# Patient Record
Sex: FEMALE | Race: WHITE
[De-identification: names, ages, dates, MRNs, and addresses within clinical notes are randomized per-mention and may not be internally consistent; named-entity substitution may affect disease eponyms.]

---

## 2018-07-09 ENCOUNTER — OTHER (OUTPATIENT)
Age: 33
End: 2018-07-09

## 2018-07-09 PROBLEM — Z00.00 ENCOUNTER FOR PREVENTIVE HEALTH EXAMINATION: Status: ACTIVE | Noted: 2018-07-09

## 2018-07-12 ENCOUNTER — APPOINTMENT (OUTPATIENT)
Dept: OTOLARYNGOLOGY | Facility: CLINIC | Age: 33
End: 2018-07-12

## 2018-08-06 ENCOUNTER — OTHER (OUTPATIENT)
Age: 33
End: 2018-08-06

## 2018-08-06 RX ORDER — RANITIDINE 150 MG/1
150 TABLET ORAL
Qty: 60 | Refills: 1 | Status: ACTIVE | COMMUNITY
Start: 2018-08-06 | End: 1900-01-01

## 2018-10-15 ENCOUNTER — OTHER (OUTPATIENT)
Age: 33
End: 2018-10-15

## 2018-11-05 ENCOUNTER — OTHER (OUTPATIENT)
Age: 33
End: 2018-11-05

## 2018-11-05 RX ORDER — RANITIDINE 300 MG/1
300 TABLET ORAL
Qty: 30 | Refills: 4 | Status: ACTIVE | COMMUNITY
Start: 2018-07-09 | End: 1900-01-01

## 2018-11-08 ENCOUNTER — OTHER (OUTPATIENT)
Age: 33
End: 2018-11-08

## 2019-05-15 ENCOUNTER — OTHER (OUTPATIENT)
Age: 34
End: 2019-05-15

## 2019-05-15 RX ORDER — BENZONATATE 100 MG/1
100 CAPSULE ORAL 3 TIMES DAILY
Qty: 30 | Refills: 1 | Status: ACTIVE | COMMUNITY
Start: 2019-05-15 | End: 1900-01-01

## 2019-07-10 ENCOUNTER — OTHER (OUTPATIENT)
Age: 34
End: 2019-07-10

## 2019-07-10 RX ORDER — AMOXICILLIN 500 MG/1
500 TABLET, FILM COATED ORAL
Qty: 14 | Refills: 0 | Status: ACTIVE | COMMUNITY
Start: 2019-07-10 | End: 1900-01-01

## 2019-10-07 ENCOUNTER — OTHER (OUTPATIENT)
Age: 34
End: 2019-10-07

## 2019-12-23 ENCOUNTER — OTHER (OUTPATIENT)
Age: 34
End: 2019-12-23

## 2019-12-23 RX ORDER — FLUTICASONE PROPIONATE 50 UG/1
50 SPRAY, METERED NASAL
Qty: 1 | Refills: 3 | Status: ACTIVE | COMMUNITY
Start: 2018-10-15 | End: 1900-01-01

## 2020-02-10 RX ORDER — ADAPALENE AND BENZOYL PEROXIDE 1; 25 MG/G; MG/G
0.1-2.5 GEL TOPICAL
Qty: 1 | Refills: 3 | Status: ACTIVE | COMMUNITY
Start: 2020-02-10 | End: 1900-01-01

## 2020-02-24 RX ORDER — ADAPALENE 1 MG/G
0.1 CREAM TOPICAL
Qty: 1 | Refills: 4 | Status: ACTIVE | COMMUNITY
Start: 2020-02-24 | End: 1900-01-01

## 2020-03-19 RX ORDER — ALBUTEROL SULFATE 90 UG/1
108 (90 BASE) INHALANT RESPIRATORY (INHALATION)
Qty: 1 | Refills: 0 | Status: ACTIVE | COMMUNITY
Start: 2019-10-07 | End: 1900-01-01

## 2020-04-12 ENCOUNTER — TRANSCRIPTION ENCOUNTER (OUTPATIENT)
Age: 35
End: 2020-04-12

## 2020-04-25 ENCOUNTER — MESSAGE (OUTPATIENT)
Age: 35
End: 2020-04-25

## 2020-05-04 ENCOUNTER — APPOINTMENT (OUTPATIENT)
Dept: DISASTER EMERGENCY | Facility: HOSPITAL | Age: 35
End: 2020-05-04

## 2020-05-05 LAB
SARS-COV-2 IGG SERPL IA-ACNC: <0.1 INDEX
SARS-COV-2 IGG SERPL QL IA: NEGATIVE

## 2021-07-07 ENCOUNTER — OUTPATIENT (OUTPATIENT)
Dept: OUTPATIENT SERVICES | Facility: HOSPITAL | Age: 36
LOS: 1 days | Discharge: HOME | End: 2021-07-07
Payer: COMMERCIAL

## 2021-07-07 DIAGNOSIS — R92.2 INCONCLUSIVE MAMMOGRAM: ICD-10-CM

## 2021-07-07 DIAGNOSIS — Z12.31 ENCOUNTER FOR SCREENING MAMMOGRAM FOR MALIGNANT NEOPLASM OF BREAST: ICD-10-CM

## 2021-07-07 PROCEDURE — 77067 SCR MAMMO BI INCL CAD: CPT | Mod: 26

## 2021-07-07 PROCEDURE — 76641 ULTRASOUND BREAST COMPLETE: CPT | Mod: 26,50

## 2021-07-07 PROCEDURE — 77063 BREAST TOMOSYNTHESIS BI: CPT | Mod: 26

## 2021-07-23 ENCOUNTER — OUTPATIENT (OUTPATIENT)
Dept: OUTPATIENT SERVICES | Facility: HOSPITAL | Age: 36
LOS: 1 days | Discharge: HOME | End: 2021-07-23
Payer: COMMERCIAL

## 2021-07-23 DIAGNOSIS — R92.8 OTHER ABNORMAL AND INCONCLUSIVE FINDINGS ON DIAGNOSTIC IMAGING OF BREAST: ICD-10-CM

## 2021-07-23 PROCEDURE — 76642 ULTRASOUND BREAST LIMITED: CPT | Mod: 26,RT

## 2021-07-23 PROCEDURE — 77065 DX MAMMO INCL CAD UNI: CPT | Mod: 26,RT

## 2021-07-23 PROCEDURE — G0279: CPT | Mod: 26

## 2022-01-26 ENCOUNTER — OUTPATIENT (OUTPATIENT)
Dept: OUTPATIENT SERVICES | Facility: HOSPITAL | Age: 37
LOS: 1 days | Discharge: HOME | End: 2022-01-26
Payer: COMMERCIAL

## 2022-01-26 DIAGNOSIS — R92.8 OTHER ABNORMAL AND INCONCLUSIVE FINDINGS ON DIAGNOSTIC IMAGING OF BREAST: ICD-10-CM

## 2022-01-26 PROCEDURE — 76642 ULTRASOUND BREAST LIMITED: CPT | Mod: 26,RT

## 2022-05-28 ENCOUNTER — RESULT REVIEW (OUTPATIENT)
Age: 37
End: 2022-05-28

## 2022-05-28 ENCOUNTER — OUTPATIENT (OUTPATIENT)
Dept: OUTPATIENT SERVICES | Facility: HOSPITAL | Age: 37
LOS: 1 days | Discharge: HOME | End: 2022-05-28
Payer: COMMERCIAL

## 2022-05-28 DIAGNOSIS — R10.2 PELVIC AND PERINEAL PAIN: ICD-10-CM

## 2022-05-28 PROCEDURE — 76830 TRANSVAGINAL US NON-OB: CPT | Mod: 26

## 2022-05-28 PROCEDURE — 76856 US EXAM PELVIC COMPLETE: CPT | Mod: 26,59

## 2022-06-02 ENCOUNTER — APPOINTMENT (OUTPATIENT)
Dept: OBGYN | Facility: CLINIC | Age: 37
End: 2022-06-02

## 2022-07-06 ENCOUNTER — LABORATORY RESULT (OUTPATIENT)
Age: 37
End: 2022-07-06

## 2022-07-06 ENCOUNTER — APPOINTMENT (OUTPATIENT)
Dept: OBGYN | Facility: CLINIC | Age: 37
End: 2022-07-06

## 2022-07-06 VITALS — BODY MASS INDEX: 28.71 KG/M2 | TEMPERATURE: 98.1 F | HEIGHT: 62 IN | WEIGHT: 156 LBS

## 2022-07-06 DIAGNOSIS — R92.2 INCONCLUSIVE MAMMOGRAM: ICD-10-CM

## 2022-07-06 DIAGNOSIS — Z30.432 ENCOUNTER FOR REMOVAL OF INTRAUTERINE CONTRACEPTIVE DEVICE: ICD-10-CM

## 2022-07-06 PROCEDURE — 99213 OFFICE O/P EST LOW 20 MIN: CPT | Mod: 25

## 2022-07-06 PROCEDURE — 58301 REMOVE INTRAUTERINE DEVICE: CPT

## 2022-07-06 PROCEDURE — 99395 PREV VISIT EST AGE 18-39: CPT | Mod: 25

## 2022-07-06 PROCEDURE — 81025 URINE PREGNANCY TEST: CPT

## 2022-08-24 ENCOUNTER — OUTPATIENT (OUTPATIENT)
Dept: OUTPATIENT SERVICES | Facility: HOSPITAL | Age: 37
LOS: 1 days | Discharge: HOME | End: 2022-08-24

## 2022-08-24 DIAGNOSIS — R92.8 OTHER ABNORMAL AND INCONCLUSIVE FINDINGS ON DIAGNOSTIC IMAGING OF BREAST: ICD-10-CM

## 2022-08-24 PROCEDURE — 76642 ULTRASOUND BREAST LIMITED: CPT | Mod: 26,RT

## 2023-03-02 ENCOUNTER — LABORATORY RESULT (OUTPATIENT)
Age: 38
End: 2023-03-02

## 2023-03-02 ENCOUNTER — APPOINTMENT (OUTPATIENT)
Dept: OBGYN | Facility: CLINIC | Age: 38
End: 2023-03-02
Payer: COMMERCIAL

## 2023-03-02 VITALS — TEMPERATURE: 97.9 F | WEIGHT: 140.4 LBS | BODY MASS INDEX: 25.83 KG/M2 | HEIGHT: 62 IN

## 2023-03-02 DIAGNOSIS — N39.3 STRESS INCONTINENCE (FEMALE) (MALE): ICD-10-CM

## 2023-03-02 DIAGNOSIS — N94.6 DYSMENORRHEA, UNSPECIFIED: ICD-10-CM

## 2023-03-02 DIAGNOSIS — Z01.419 ENCOUNTER FOR GYNECOLOGICAL EXAMINATION (GENERAL) (ROUTINE) W/OUT ABNORMAL FINDINGS: ICD-10-CM

## 2023-03-02 PROCEDURE — 99395 PREV VISIT EST AGE 18-39: CPT

## 2023-03-24 ENCOUNTER — OUTPATIENT (OUTPATIENT)
Dept: OUTPATIENT SERVICES | Facility: HOSPITAL | Age: 38
LOS: 1 days | End: 2023-03-24
Payer: COMMERCIAL

## 2023-03-24 ENCOUNTER — RESULT REVIEW (OUTPATIENT)
Age: 38
End: 2023-03-24

## 2023-03-24 DIAGNOSIS — R92.8 OTHER ABNORMAL AND INCONCLUSIVE FINDINGS ON DIAGNOSTIC IMAGING OF BREAST: ICD-10-CM

## 2023-03-24 DIAGNOSIS — Z00.8 ENCOUNTER FOR OTHER GENERAL EXAMINATION: ICD-10-CM

## 2023-03-24 PROCEDURE — 76642 ULTRASOUND BREAST LIMITED: CPT | Mod: RT

## 2023-03-24 PROCEDURE — 76642 ULTRASOUND BREAST LIMITED: CPT | Mod: 26,RT

## 2023-03-25 DIAGNOSIS — R92.8 OTHER ABNORMAL AND INCONCLUSIVE FINDINGS ON DIAGNOSTIC IMAGING OF BREAST: ICD-10-CM

## 2023-06-19 ENCOUNTER — NON-APPOINTMENT (OUTPATIENT)
Age: 38
End: 2023-06-19

## 2023-06-23 ENCOUNTER — NON-APPOINTMENT (OUTPATIENT)
Age: 38
End: 2023-06-23

## 2023-07-05 ENCOUNTER — APPOINTMENT (OUTPATIENT)
Dept: OBGYN | Facility: CLINIC | Age: 38
End: 2023-07-05
Payer: COMMERCIAL

## 2023-07-05 VITALS — BODY MASS INDEX: 26.13 KG/M2 | WEIGHT: 142 LBS | HEIGHT: 62 IN

## 2023-07-05 DIAGNOSIS — O09.529 SUPERVISION OF ELDERLY MULTIGRAVIDA, UNSPECIFIED TRIMESTER: ICD-10-CM

## 2023-07-05 DIAGNOSIS — Z87.410 PERSONAL HISTORY OF CERVICAL DYSPLASIA: ICD-10-CM

## 2023-07-05 DIAGNOSIS — Z86.19 PERSONAL HISTORY OF OTHER INFECTIOUS AND PARASITIC DISEASES: ICD-10-CM

## 2023-07-05 PROCEDURE — 99214 OFFICE O/P EST MOD 30 MIN: CPT

## 2023-07-05 PROCEDURE — 76830 TRANSVAGINAL US NON-OB: CPT

## 2023-07-07 PROBLEM — O09.529 AMA (ADVANCED MATERNAL AGE) MULTIGRAVIDA 35+: Status: ACTIVE | Noted: 2023-07-07

## 2023-07-07 PROBLEM — Z87.410 HISTORY OF CERVICAL DYSPLASIA: Status: ACTIVE | Noted: 2022-07-06

## 2023-07-07 PROBLEM — Z86.19 HISTORY OF HERPES SIMPLEX INFECTION: Status: ACTIVE | Noted: 2023-07-07

## 2023-07-27 ENCOUNTER — APPOINTMENT (OUTPATIENT)
Dept: OBGYN | Facility: CLINIC | Age: 38
End: 2023-07-27
Payer: COMMERCIAL

## 2023-07-27 VITALS — TEMPERATURE: 97.8 F | WEIGHT: 146 LBS | HEIGHT: 62 IN | BODY MASS INDEX: 26.87 KG/M2

## 2023-07-27 DIAGNOSIS — E28.2 POLYCYSTIC OVARIAN SYNDROME: ICD-10-CM

## 2023-07-27 DIAGNOSIS — N91.1 SECONDARY AMENORRHEA: ICD-10-CM

## 2023-07-27 PROCEDURE — 76830 TRANSVAGINAL US NON-OB: CPT

## 2023-07-27 PROCEDURE — 99213 OFFICE O/P EST LOW 20 MIN: CPT

## 2023-08-01 ENCOUNTER — RESULT REVIEW (OUTPATIENT)
Age: 38
End: 2023-08-01

## 2023-08-01 ENCOUNTER — OUTPATIENT (OUTPATIENT)
Dept: OUTPATIENT SERVICES | Facility: HOSPITAL | Age: 38
LOS: 1 days | Discharge: HOME | End: 2023-08-01
Payer: COMMERCIAL

## 2023-08-01 ENCOUNTER — TRANSCRIPTION ENCOUNTER (OUTPATIENT)
Age: 38
End: 2023-08-01

## 2023-08-01 VITALS
HEART RATE: 67 BPM | SYSTOLIC BLOOD PRESSURE: 109 MMHG | OXYGEN SATURATION: 100 % | RESPIRATION RATE: 14 BRPM | TEMPERATURE: 98 F | DIASTOLIC BLOOD PRESSURE: 67 MMHG

## 2023-08-01 VITALS
TEMPERATURE: 98 F | OXYGEN SATURATION: 100 % | SYSTOLIC BLOOD PRESSURE: 120 MMHG | HEART RATE: 77 BPM | HEIGHT: 62 IN | WEIGHT: 145.06 LBS | RESPIRATION RATE: 18 BRPM | DIASTOLIC BLOOD PRESSURE: 59 MMHG

## 2023-08-01 DIAGNOSIS — Z98.890 OTHER SPECIFIED POSTPROCEDURAL STATES: Chronic | ICD-10-CM

## 2023-08-01 DIAGNOSIS — O02.1 MISSED ABORTION: ICD-10-CM

## 2023-08-01 LAB — ABO RH CONFIRMATION: SIGNIFICANT CHANGE UP

## 2023-08-01 PROCEDURE — 81229 CYTOG ALYS CHRML ABNR SNPCGH: CPT

## 2023-08-01 PROCEDURE — 88305 TISSUE EXAM BY PATHOLOGIST: CPT | Mod: 26

## 2023-08-01 PROCEDURE — 88280 CHROMOSOME KARYOTYPE STUDY: CPT

## 2023-08-01 PROCEDURE — 88305 TISSUE EXAM BY PATHOLOGIST: CPT

## 2023-08-01 PROCEDURE — 86900 BLOOD TYPING SEROLOGIC ABO: CPT

## 2023-08-01 PROCEDURE — 88264 CHROMOSOME ANALYSIS 20-25: CPT

## 2023-08-01 PROCEDURE — 88233 TISSUE CULTURE SKIN/BIOPSY: CPT

## 2023-08-01 PROCEDURE — 36415 COLL VENOUS BLD VENIPUNCTURE: CPT

## 2023-08-01 PROCEDURE — 86901 BLOOD TYPING SEROLOGIC RH(D): CPT

## 2023-08-01 PROCEDURE — 86850 RBC ANTIBODY SCREEN: CPT

## 2023-08-01 PROCEDURE — 59820 CARE OF MISCARRIAGE: CPT

## 2023-08-01 RX ORDER — ONDANSETRON 8 MG/1
4 TABLET, FILM COATED ORAL ONCE
Refills: 0 | Status: DISCONTINUED | OUTPATIENT
Start: 2023-08-01 | End: 2023-08-01

## 2023-08-01 RX ORDER — SODIUM CHLORIDE 9 MG/ML
1000 INJECTION, SOLUTION INTRAVENOUS
Refills: 0 | Status: DISCONTINUED | OUTPATIENT
Start: 2023-08-01 | End: 2023-08-01

## 2023-08-01 RX ORDER — OXYCODONE HYDROCHLORIDE 5 MG/1
5 TABLET ORAL ONCE
Refills: 0 | Status: DISCONTINUED | OUTPATIENT
Start: 2023-08-01 | End: 2023-08-01

## 2023-08-01 RX ORDER — HYDROMORPHONE HYDROCHLORIDE 2 MG/ML
0.5 INJECTION INTRAMUSCULAR; INTRAVENOUS; SUBCUTANEOUS
Refills: 0 | Status: DISCONTINUED | OUTPATIENT
Start: 2023-08-01 | End: 2023-08-01

## 2023-08-01 NOTE — BRIEF OPERATIVE NOTE - OPERATION/FINDINGS
normal external female genitalia. On bimanual exam 8-week sized anteverted uterus was noted. Speculum exam showed normal vaginal and multiparous cervix. Cervix was dilated to accommodate size 8 flexible suction curette. Products of conception were removed, specimen collected for cytogenetic testing. Scant curettings collected.

## 2023-08-01 NOTE — ASU DISCHARGE PLAN (ADULT/PEDIATRIC) - CARE PROVIDER_API CALL
Telly Islas  Obstetrics and Gynecology  [address] [place] [id number]  Phone: [phone]  Fax: [phone]  Follow Up Time: 2 weeks

## 2023-08-01 NOTE — BRIEF OPERATIVE NOTE - NSICDXBRIEFPROCEDURE_GEN_ALL_CORE_FT
PROCEDURES:  Dilation and curettage, uterus, using suction, for missed first trimester  01-Aug-2023 09:14:00  Cassius Lo  Exam under anesthesia, pelvis 01-Aug-2023 09:14:29  Cassius Lo

## 2023-08-01 NOTE — ASU DISCHARGE PLAN (ADULT/PEDIATRIC) - NS MD DC FALL RISK RISK
For information on Fall & Injury Prevention, visit: [email or web address] OR  [email or web address] OR  https://www.cdc.gov/steadi/patient.html

## 2023-08-01 NOTE — CHART NOTE - NSCHARTNOTEFT_GEN_A_CORE
PACU ANESTHESIA ADMISSION NOTE      Procedure: Dilation and curettage, uterus, using suction, for missed first trimester     Exam under anesthesia, pelvis      Post op diagnosis:  Missed         __x__  Patent Airway    __x__  Full return of protective reflexes    ____  Full recovery from anesthesia / back to baseline     Vitals:   see anesthesia record      Mental Status:  _x___ Awake   _____ Alert   _____ Drowsy   _____ Sedated    Nausea/Vomiting:  __x__ NO  ______Yes,   See Post - Op Orders          Pain Scale (0-10):  _____    Treatment: ____ None    ___x_ See Post - Op/PCA Orders    Post - Operative Fluids:   ____ Oral   __x__ See Post - Op Orders    Plan: Discharge:   __x__Home       _____Floor     _____Critical Care    _____  Other:_________________    Comments:  Uneventful intraoperative course. No anesthesia issues or complications noted. Patient stable upon arrival to PACU. Report given to RN. Discharge when criteria met.

## 2023-08-02 PROBLEM — Z78.9 OTHER SPECIFIED HEALTH STATUS: Chronic | Status: ACTIVE | Noted: 2023-08-01

## 2023-08-02 LAB — SURGICAL PATHOLOGY STUDY: SIGNIFICANT CHANGE UP

## 2023-08-03 DIAGNOSIS — O02.1 MISSED ABORTION: ICD-10-CM

## 2023-08-03 DIAGNOSIS — O34.591 MATERNAL CARE FOR OTHER ABNORMALITIES OF GRAVID UTERUS, FIRST TRIMESTER: ICD-10-CM

## 2023-08-10 ENCOUNTER — APPOINTMENT (OUTPATIENT)
Dept: OBGYN | Facility: CLINIC | Age: 38
End: 2023-08-10
Payer: COMMERCIAL

## 2023-08-10 VITALS — HEIGHT: 62 IN | BODY MASS INDEX: 26.68 KG/M2 | TEMPERATURE: 97.2 F | WEIGHT: 145 LBS

## 2023-08-10 DIAGNOSIS — O02.1 MISSED ABORTION: ICD-10-CM

## 2023-08-10 DIAGNOSIS — Z30.430 ENCOUNTER FOR INSERTION OF INTRAUTERINE CONTRACEPTIVE DEVICE: ICD-10-CM

## 2023-08-10 PROCEDURE — 76830 TRANSVAGINAL US NON-OB: CPT

## 2023-08-10 PROCEDURE — 99024 POSTOP FOLLOW-UP VISIT: CPT

## 2023-08-10 PROCEDURE — 58300 INSERT INTRAUTERINE DEVICE: CPT

## 2023-08-11 PROBLEM — O02.1 MISSED ABORTION: Status: ACTIVE | Noted: 2023-07-27

## 2023-08-11 PROBLEM — Z30.430 ENCOUNTER FOR INSERTION OF COPPER IUD: Status: ACTIVE | Noted: 2023-08-11

## 2023-08-11 NOTE — ASSESSMENT
[FreeTextEntry1] : 38 YEAR OLD FEMALE LMP 2023 PRSENTS FOR INSERTION OF COPPER IUD AFTER HAVING HAD DILATATION AND CURETTAGE 2023 FOR MISSED .  PROCEDURE EXPLAINED; R/B/A DISCUSSED; ALL QUESTIONS ANSWERED. CCNSENT SIGNED AND WITNESSED.  PROCEDURE; /62;          STERILE SPECULUM PLACED INTO THE VAGINA AND THE CERVIX VISUALIZED. THE CERVIX AND  UPPER VAGINA WERE PREPPED WITH BETADINE AND A STERILE SINGLE TOOTH TENACULUM WAS PLACED ONTO THE ANTERIOR LIP OF THE CERVIX.  CERVI AMANDA CANAL FINDER WAS USED FOLLOWED BY A PIPELLE AND THE UTERUS WAS SOUNDED TO 8.5 CM AND ANTEVERTED. COPPER IU- PARAGARD, WAS INSTERTED IN THE USUAL MANNER INTO THE UTERUS WITHOUT DIFFICULTY AND THE STRINGS CUT TO THE RECOMMEDED 3 CM.   ALL INSTUMENTS WERE REMOVED. THE PATIENT TOLERATED THE PROCEDURE WELL AND GOOD HEMOSTASIS WAS NOTED.  TV US WAS PERFORMED DEMONSTRATING THE IUD IN THE UTERINE CAVITY ON SAGITAL AND TRANSVERSE VIEWS.  PT WAS INSTRUCTED NOT TO PUT ANYTHING INTO THE VAGINA FOR THE NEXT 72 HOURS AND TO RETURN TO THE OFFICE IN ONE MONTH FOR US TO CONFIRM THAT THE IUD HAD NOT BEEN SPONTANEOUSLY EXPELLED , OR CHANGED POSITION.

## 2023-08-11 NOTE — PLAN
[FreeTextEntry1] : 38 YEAR OLD FEMALE LMP 2023 STATUS POST D AND C WITH SUCTION FOR MISSED ABORITOIN 2023 RETURNS FOR POST OPERATIVE APPT. AND FOR CONTRACEPTIVE COUNSELING. PT LEAVING ON A CRUISE 2023 AND NEEDED TO BE SEEN PRIOR TO LEAVING. NO COMPLAINTS SINCE PROCEDURE.PT DID NOT BLEED INITIALLY AND BLED 2 DAYS LATER. NOW, STILL WITH SOME BROWN LIGHT STAINING/BLEEDING. PT TOOK POST OPERATIVE DOXYCLCINE FOR 5 DAYS.  PT WANTS COPPER IUD TODAY IF POSSIBLE.  PE /62; TEMP 97.2;WEIGHT 145 LBS ;HEIGHT 5'2"       ABDOMEN;SOFT, NO MASSES ; NO ABDOMINAL TENDERNESS       PELVIC; NORMAL EXTERNAL GENITALIA                      NORMAL VAGINA WITOUT LESION                      NORMAL CERVIX WITHOUT LESION; CERVIX CLOSED AND SMALL AMOUNT OF DARK                         BLOOD NOTED.                       ANTEVERTED NORMAL UTERUS ON BIMANUAL EXAMINATION                      NO PALPABLE ADNEXAL MASSES  IMP; STATUS POST D AND C FOR MISSED  DOING WELL  PLAN; PT MAY RESUME NORMAL ACTIVITIES IN 3-5 DAYS; ;MAY RESUME SEXUAL ACTIVITIES IF BLEEDING STOPPED.  DISCUSSED CONTRACEPTION AND PT NOT READY TO ATTEMPT PREGNANCY IN THE NEAR FUTURE AND WOULD LIKE ANOTHER IUD - COPPER - INSERTED. PT HAS HAD SIMILAR IUD IN THE PAST. PT TO CALL IN 3 WKS FOR CYTOGENETIC RESULTS. FORM D AND C.

## 2023-08-15 LAB — CHROM ANALY OVERALL INTERP SPEC-IMP: SIGNIFICANT CHANGE UP

## 2023-08-31 LAB — PRENATAL GENOME CHROMO MICROARRAY: NEGATIVE — SIGNIFICANT CHANGE UP

## 2023-09-07 ENCOUNTER — APPOINTMENT (OUTPATIENT)
Dept: OBGYN | Facility: CLINIC | Age: 38
End: 2023-09-07
Payer: COMMERCIAL

## 2023-09-07 PROCEDURE — 76830 TRANSVAGINAL US NON-OB: CPT

## 2023-09-13 ENCOUNTER — NON-APPOINTMENT (OUTPATIENT)
Age: 38
End: 2023-09-13

## 2023-10-05 ENCOUNTER — NON-APPOINTMENT (OUTPATIENT)
Age: 38
End: 2023-10-05

## 2023-10-06 ENCOUNTER — OUTPATIENT (OUTPATIENT)
Dept: OUTPATIENT SERVICES | Facility: HOSPITAL | Age: 38
LOS: 1 days | End: 2023-10-06
Payer: COMMERCIAL

## 2023-10-06 ENCOUNTER — RESULT REVIEW (OUTPATIENT)
Age: 38
End: 2023-10-06

## 2023-10-06 DIAGNOSIS — Z12.31 ENCOUNTER FOR SCREENING MAMMOGRAM FOR MALIGNANT NEOPLASM OF BREAST: ICD-10-CM

## 2023-10-06 DIAGNOSIS — R92.8 OTHER ABNORMAL AND INCONCLUSIVE FINDINGS ON DIAGNOSTIC IMAGING OF BREAST: ICD-10-CM

## 2023-10-06 DIAGNOSIS — Z98.890 OTHER SPECIFIED POSTPROCEDURAL STATES: Chronic | ICD-10-CM

## 2023-10-06 PROCEDURE — 76642 ULTRASOUND BREAST LIMITED: CPT | Mod: 26,RT

## 2023-10-06 PROCEDURE — 76642 ULTRASOUND BREAST LIMITED: CPT | Mod: RT

## 2023-10-07 DIAGNOSIS — R92.8 OTHER ABNORMAL AND INCONCLUSIVE FINDINGS ON DIAGNOSTIC IMAGING OF BREAST: ICD-10-CM

## 2024-07-16 ENCOUNTER — LABORATORY RESULT (OUTPATIENT)
Age: 39
End: 2024-07-16

## 2024-07-17 ENCOUNTER — APPOINTMENT (OUTPATIENT)
Dept: OBGYN | Facility: CLINIC | Age: 39
End: 2024-07-17
Payer: COMMERCIAL

## 2024-07-17 VITALS — TEMPERATURE: 97.9 F | WEIGHT: 135 LBS | BODY MASS INDEX: 24.84 KG/M2 | HEIGHT: 62 IN

## 2024-07-17 DIAGNOSIS — Z01.419 ENCOUNTER FOR GYNECOLOGICAL EXAMINATION (GENERAL) (ROUTINE) W/OUT ABNORMAL FINDINGS: ICD-10-CM

## 2024-07-17 DIAGNOSIS — Z97.5 PRESENCE OF (INTRAUTERINE) CONTRACEPTIVE DEVICE: ICD-10-CM

## 2024-07-17 DIAGNOSIS — R10.9 UNSPECIFIED ABDOMINAL PAIN: ICD-10-CM

## 2024-07-17 DIAGNOSIS — N94.10 UNSPECIFIED DYSPAREUNIA: ICD-10-CM

## 2024-07-17 PROCEDURE — 99395 PREV VISIT EST AGE 18-39: CPT

## 2024-07-24 ENCOUNTER — APPOINTMENT (OUTPATIENT)
Dept: OBGYN | Facility: CLINIC | Age: 39
End: 2024-07-24

## 2024-07-24 PROCEDURE — 76830 TRANSVAGINAL US NON-OB: CPT

## 2024-07-24 PROCEDURE — 93975 VASCULAR STUDY: CPT

## 2024-08-01 ENCOUNTER — NON-APPOINTMENT (OUTPATIENT)
Age: 39
End: 2024-08-01

## 2025-03-19 ENCOUNTER — OUTPATIENT (OUTPATIENT)
Dept: OUTPATIENT SERVICES | Facility: HOSPITAL | Age: 40
LOS: 1 days | End: 2025-03-19
Payer: COMMERCIAL

## 2025-03-19 DIAGNOSIS — Z12.31 ENCOUNTER FOR SCREENING MAMMOGRAM FOR MALIGNANT NEOPLASM OF BREAST: ICD-10-CM

## 2025-03-19 DIAGNOSIS — Z98.890 OTHER SPECIFIED POSTPROCEDURAL STATES: Chronic | ICD-10-CM

## 2025-03-19 DIAGNOSIS — R92.2 INCONCLUSIVE MAMMOGRAM: ICD-10-CM

## 2025-03-19 PROCEDURE — 77067 SCR MAMMO BI INCL CAD: CPT | Mod: 26

## 2025-03-19 PROCEDURE — 77063 BREAST TOMOSYNTHESIS BI: CPT | Mod: 26

## 2025-03-19 PROCEDURE — 76641 ULTRASOUND BREAST COMPLETE: CPT | Mod: 26,50

## 2025-03-19 PROCEDURE — 77067 SCR MAMMO BI INCL CAD: CPT

## 2025-03-19 PROCEDURE — 76641 ULTRASOUND BREAST COMPLETE: CPT | Mod: 50

## 2025-03-19 PROCEDURE — 77063 BREAST TOMOSYNTHESIS BI: CPT

## 2025-03-20 DIAGNOSIS — Z12.31 ENCOUNTER FOR SCREENING MAMMOGRAM FOR MALIGNANT NEOPLASM OF BREAST: ICD-10-CM

## 2025-03-20 DIAGNOSIS — R92.2 INCONCLUSIVE MAMMOGRAM: ICD-10-CM

## 2025-07-17 ENCOUNTER — APPOINTMENT (OUTPATIENT)
Dept: OBGYN | Facility: CLINIC | Age: 40
End: 2025-07-17
Payer: COMMERCIAL

## 2025-07-17 ENCOUNTER — LABORATORY RESULT (OUTPATIENT)
Age: 40
End: 2025-07-17

## 2025-07-17 VITALS — HEIGHT: 62 IN | BODY MASS INDEX: 25.58 KG/M2 | TEMPERATURE: 98.7 F | WEIGHT: 139 LBS

## 2025-07-17 LAB
APPEARANCE: CLEAR
BILIRUBIN URINE: NEGATIVE
BLOOD URINE: ABNORMAL
COLOR: YELLOW
GLUCOSE QUALITATIVE U: NEGATIVE
KETONES URINE: NEGATIVE
LEUKOCYTE ESTERASE URINE: NEGATIVE
NITRITE URINE: NEGATIVE
PH URINE: 6
PROTEIN URINE: NEGATIVE
SPECIFIC GRAVITY URINE: >=1.03
UROBILINOGEN URINE: 0.2 (ref 0.2–?)

## 2025-07-17 PROCEDURE — 99396 PREV VISIT EST AGE 40-64: CPT

## 2025-07-17 PROCEDURE — 76830 TRANSVAGINAL US NON-OB: CPT

## 2025-07-20 PROBLEM — N93.0 PCB (POST COITAL BLEEDING): Status: ACTIVE | Noted: 2025-07-20

## 2025-08-11 ENCOUNTER — NON-APPOINTMENT (OUTPATIENT)
Age: 40
End: 2025-08-11